# Patient Record
Sex: FEMALE | Race: WHITE | Employment: FULL TIME | URBAN - METROPOLITAN AREA
[De-identification: names, ages, dates, MRNs, and addresses within clinical notes are randomized per-mention and may not be internally consistent; named-entity substitution may affect disease eponyms.]

---

## 2017-04-13 ENCOUNTER — HOSPITAL ENCOUNTER (OUTPATIENT)
Dept: MAMMOGRAPHY | Age: 62
Discharge: HOME | End: 2017-04-13
Attending: INTERNAL MEDICINE | Admitting: INTERNAL MEDICINE

## 2017-04-13 DIAGNOSIS — Z13.820 SCREENING FOR OSTEOPOROSIS: ICD-10-CM

## 2018-03-16 ENCOUNTER — HOSPITAL ENCOUNTER (OUTPATIENT)
Dept: MAMMOGRAPHY | Age: 63
Discharge: HOME | End: 2018-03-16
Attending: SURGERY | Admitting: SURGERY

## 2018-03-16 DIAGNOSIS — Z12.31 ENCOUNTER FOR SCREENING MAMMOGRAM FOR BREAST CANCER: ICD-10-CM

## 2018-07-23 ENCOUNTER — INITIAL CONSULT (OUTPATIENT)
Dept: GASTROENTEROLOGY | Age: 63
End: 2018-07-23

## 2018-07-23 VITALS
WEIGHT: 177 LBS | SYSTOLIC BLOOD PRESSURE: 134 MMHG | DIASTOLIC BLOOD PRESSURE: 78 MMHG | HEIGHT: 63 IN | BODY MASS INDEX: 31.36 KG/M2

## 2018-07-23 DIAGNOSIS — K58.0 IRRITABLE BOWEL SYNDROME WITH DIARRHEA: Primary | ICD-10-CM

## 2018-07-23 PROCEDURE — 99204 OFFICE O/P NEW MOD 45 MIN: CPT | Performed by: INTERNAL MEDICINE

## 2018-07-23 RX ORDER — ROSUVASTATIN CALCIUM 10 MG/1
10 TABLET, COATED ORAL DAILY
COMMUNITY

## 2018-07-23 NOTE — PATIENT INSTRUCTIONS
esophagus, but rarely before the age of five years. Gender - Men are more commonly diagnosed with Pearson's esophagus than women. Ethnic background - Pearson's esophagus is equally common in white and  populations and is uncommon in black and  populations. Lifestyle - Smokers are more commonly diagnosed with Pearson's esophagus than nonsmokers. PEARSON'S ESOPHAGUS SYMPTOMS - Pearson's esophagus itself produces no symptoms. Instead, most people seek help because of symptoms of GERD, including heartburn, regurgitation of stomach contents, and, less commonly, difficulty swallowing. PEARSON'S ESOPHAGUS DIAGNOSIS - A healthcare provider may suspect Pearson's esophagus based upon a person's symptoms and the risk factors described above. An endoscopy is needed to confirm the abnormal esophageal lining. Upper endoscopy - Upper endoscopy is a test that allows your doctor to see the inside of the esophagus and stomach. Before the test, you are sedated to prevent discomfort. The doctor will insert a thin lighted tube into the esophagus. The tube has a camera, which allows the doctor to see the lining of the esophagus. Normally, the lining should appear pale and glossy; in a person with Pearson's esophagus, the lining appears pink or red and velvety. The doctor will remove a small sample of the lining during the endoscopy so that it can be examined with a microscope for signs of Pearson's. (See \"Patient information: Upper endoscopy\". )    Endoscopy detects most (80 percent) but not all cases of Pearson's esophagus. Individual variations in the anatomy of the esophagus and the area where it meets the stomach can make the diagnosis of Pearson's esophagus difficult in some people. PEARSON'S ESOPHAGUS TREATMENT - The goal of treatment in patients with Pearson's esophagus is to control reflux symptoms.  Aggressive reflux treatment may be more effective in preventing cancer than treating only when there are reflux symptoms. (See \"Management of Pearson's esophagus\". )    Behavior and diet changes - The first priority in treating Pearson's esophagus is to stop the damage to the esophageal lining, which usually means eliminating acid reflux. Most patients are advised to avoid certain foods and behaviors that increase the risk of reflux. Foods that can worsen reflux include:        Chocolate      Coffee and tea      Peppermint      Alcohol      Fatty foods    Acidic juices such as orange or tomato juice may also worsen symptoms. Carbonated beverages can be a problem for some people. (See \"Patient information: Acid reflux (gastroesophageal reflux disease) in adults\". )    Behaviors that can worsen reflux include eating meals just before going to bed, lying down after eating meals, and eating very large meals. Placing bricks or blocks under the head of the bed (to raise it by about six inches) help to keep acid in the stomach while sleeping. It is not helpful to use additional pillows under the head. Medications - A clinician may prescribe medications that reduce the amount of acid produced by the stomach. A class of medications called proton pump inhibitors are commonly recommended. Five different formulations (some of which are available as a generic) are currently available: omeprazole (Prilosec®), esomeprazole (Nexium®), lansoprazole (Prevacid®), rabeprazole (Aciphex®) and pantoprazole (Protonix®); any of these is an acceptable option. Surgery - People who have severe reflux may benefit from surgical procedures to reduce reflux. Surgery is not the best treatment in all situations, so you should discuss this option with your doctor. More information about surgical treatments for reflux is available in a separate topic review.  (See \"Patient information: Acid reflux (gastroesophageal reflux disease) in adults\".)    PEARSON'S ESOPHAGUS COMPLICATIONS - One potential complication of Pearson's esophagus is that, treatment options may have unacceptably high risks. PRECANCEROUS CHANGES AND PEARSON'S ESOPHAGUS    Confirmation and staging - If precancerous changes are discovered, they should be confirmed by a second pathologist, an expert in examining tissue samples. It is sometimes difficult to correctly identify precancerous changes, especially when there is inflammation (usually caused by the ongoing reflux of acid). Many clinicians increase the dose of acid-suppressing medications in this situation. The precancerous changes must then be graded as \"low grade dysplasia\" or \"high grade dysplasia,\" depending upon their severity. Treatment options - People with low grade dysplasia are usually told to increase their dose of acid suppressing medication and undergo a repeat endoscopy within six months. A person with high grade dysplasia has more limited options. The management of this condition is controversial. The optimal treatment depends upon the person's age and health and the patient and physician's preference. The options include removal of the esophagus (esophagectomy) and removing (eg, endoscopic mucosal resection) or destroying (eg, radiofrequency ablation, photodynamic or other ablation therapies) the abnormal tissue. Esophagectomy - Esophagectomy is the only treatment for high-grade dysplasia that clearly removes all of the precancerous tissue, although this treatment also has the highest rates of procedure-related death and long-term complications. Reasons to remove the esophagus (esophagectomy) include:        Cancer is already present in some patients with high grade dysplasia. Not removing the esophagus would mean that the person would need frequent monitoring with endoscopy and numerous biopsies. Once Pearson's esophagus has progressed to high grade dysplasia, further progression to cancer is common and may occur rapidly.       Esophageal cancer that begins to invade other tissue may be help.  Several treatments and therapies are available for irritable bowel syndrome. These measures help alleviate symptoms, but do not cure the condition. The chronic nature of irritable bowel syndrome and the challenge of controlling its symptoms can be frustrating for both patients and healthcare providers. IRRITABLE BOWEL SYNDROME CAUSES - There are a number of theories about how and why irritable bowel syndrome develops. Despite intensive research, the cause is not clear. One theory suggests that irritable bowel syndrome is caused by abnormal contractions of the colon and intestines (hence the term \"spastic bowel,\" which has sometimes been used to describe irritable bowel syndrome). Vigorous contractions of the intestines can cause severe cramps, providing the rationale for some of the treatments of IBS, such as antispasmodics and fiber (both of which help to regulate the contractions of the colon). However, abnormal contractions do not explain irritable bowel syndrome in all patients, and it is unclear whether the contractions are a symptom or cause of the disorder. Some people develop irritable bowel syndrome after a severe gastrointestinal infection (eg, Salmonella or Campylobacter). It is not clear how the infection triggers IBS to develop, and most people with irritable bowel syndrome do not have a history of these infections. People with irritable bowel syndrome who seek medical help are more likely to suffer from anxiety and stress than those who do not seek help. Stress and anxiety are known to affect the intestine; thus, it is likely that anxiety and stress worsen symptoms. However, stress or anxiety is probably not the cause. Some studies have suggested that irritable bowel syndrome is more common in people who have a history of physical, verbal, or sexual abuse.    Food intolerances are common in patients with irritable bowel syndrome, raising the possibility that it is caused by food sensitivity - Many clinicians recommend temporarily eliminating milk products since lactose intolerance is common and can aggravate irritable bowel syndrome or cause symptoms similar to IBS. The greatest concentration of lactose is found in milk and ice cream, although it is present in smaller quantities in yogurt, cottage and other cheeses, and any prepared foods that contain these ingredients   All lactose containing products should be eliminated for two weeks. If IBS symptoms improve, it is reasonable to continue avoiding lactose. If symptoms do not improve, you may resume eating lactose-containing foods. Foods that cause gas - Several foods are only partially digested in the small intestines. When they reach the colon (large intestine), further digestion takes place, which may cause gas and cramps. Eliminating these foods temporarily is reasonable if gas or bloating is bothersome. The most common gas-producing foods are legumes (such as beans) and cruciferous vegetables (such as cabbage, [place] sprouts, cauliflower, and broccoli). In addition, some people have trouble with onions, celery, carrots, raisins, bananas, apricots, prunes, sprouts, and wheat. Foods that are easier - The following table provides a list of foods that may be easier to digest in people with IBS. Increasing dietary fiber - Increasing dietary fiber (either by adding certain foods to the diet or using fiber supplements) may relieve symptoms of IBS, particularly if you have constipation. By reading the product information panel on the side of the package, you can determine the number of grams of fiber per serving. Fiber may also be helpful in people with diarrhea predominant symptoms since it can improve the consistency of stools. A bulk-forming fiber supplement (such as psyllium or methylcellulose) may also be recommended to increase fiber intake since it is difficult to consume enough fiber in the diet.  Fiber supplements should be started at a medications that may be recommended include mirtazapine (Remeron®), venlafaxine (Effexor®), and duloxetine (Cymbalta®). Antidiarrheal drugs - The drugs loperamide (Imodium®) or diphenoxylate with atropine (Lomotil®) can help slow the movement of stool through the digestive tract. Loperamide and diphenoxylate/atropine are most helpful if you have diarrhea-predominant irritable bowel syndrome. However, clinicians usually recommend that these drugs should only be used as needed rather than on a continuous basis. Anti-anxiety drugs - Anti-anxiety drugs reduce anxiety. Diazepam (Valium®), lorazepam (Ativan®), and clonazepam (Klonopin®) belong to this class of drugs. Anti-anxiety drugs are occasionally prescribed for people with short-term anxiety that is worsening their irritable bowel syndrome symptoms. However, these drugs should only be taken for short periods of time since they can be addictive. Alosetron - Alosetron (Lotronex®) blocks a hormone that is involved in intestinal contractions and sensations. It is approved to treat women with irritable bowel syndrome whose predominant symptom is diarrhea. However, it was withdrawn from the market soon after its introduction because of concerns related to safety. It was reintroduced and is currently available, although certain prescribing guidelines must be followed. Further information is available on the 's web site (www.lotronex. [employer]). Lubiprostone - Lubiprostone (Amitiza®) is available for treatment of severe constipation and irritable bowel syndrome in women over 18 years who have not responded to other treatments. It works by increasing intestinal fluid secretion. It is expensive compared to other agents. Further testing is needed to clarify the effectiveness and long-term safety of lubiprostone. Antibiotics - The role of antibiotics in the treatment of irritable bowel syndrome remains unclear.  There appear to be some patients whose irritable bowel monitor symptoms over time. If symptoms change over time, further testing may be recommended. Over time, less than 5 percent of people diagnosed with irritable bowel syndrome will be diagnosed with another gastrointestinal condition. SUMMARY  Irritable bowel syndrome (IBS) is a common gastrointestinal disorder affecting approximately 10 to 20 percent of the population. Although the condition cannot be cured, treatments are available to alleviate symptoms. No single cause of irritable bowel syndrome has been identified, although there are theories that gastrointestinal abnormalities, food intolerance, and psychological issues may be involved. (See 'Irritable bowel syndrome causes' above.)   The primary symptoms of irritable bowel syndrome are abdominal pain and changes in bowel habits (eg, diarrhea and/or constipation). Abdominal pain can vary in location and severity. Patients can experience primarily diarrhea, primarily constipation, or an alternating pattern of the two; additional gastrointestinal symptoms may also occur. (See 'Symptoms of irritable bowel syndrome' above.)   There is no single diagnostic test for irritable bowel syndrome, and several other gastrointestinal conditions produce similar symptoms; a patient's history, physical examination, and blood test results are all reviewed to rule out other disorders and establish a diagnosis of IBS. (See 'Irritable bowel syndrome diagnosis' above.)   There are many different treatments available to relieve the symptoms of irritable bowel syndrome; these include the monitoring of symptoms and patterns, adjustment of the diet to increase fiber and eliminate foods that can worsen symptoms, psychosocial therapy (since stress may aggravate IBS), and medication. Treatments are often used in combination, and because of the variability of symptoms, different treatments work for different people.  (See 'Irritable bowel syndrome treatment' above.)   Many herbal and natural therapies have been advertised for the treatment of irritable bowel syndrome; however, these therapies have not been proven effective and they are not recommended. (See 'Herbs and natural therapies for irritable bowel syndrome' above.)   Although irritable bowel syndrome can cause pain and stress, the majority of patients are able to control their symptoms and live a normal life without developing serious health problems. (See 'Irritable bowel syndrome prognosis' above.)  WHERE TO GET MORE INFORMATION - Your healthcare provider is the best source of information for questions and concerns related to your medical problem. This article will be updated as needed every four months on our Web site (www.[email or web address]/patients). Low gas diet especially broccoli, cauliflower, brussel sprouts, cabbage, and beans. Keeping a diet log avoiding disaccharides 1 week at a time (lactose, fructose, sucrose), a low FODMAP diet, a trial of otc agents including simethicone/charcoal tablets and/or beano with meals. Offered hydrogen breath testing to rule out small bacterial overgrowth, lactose, or fructose intolerance. Trial of a probiotic like Align or Culturele 1 twice a day. If not worse with fructose try to increase soluble fiber in the diet with fruit, nuts, cereal, or supplemental fiber such as citrucele, fibersure, or benefiber which can help with diarrhea. A trial if daily miralax for IBS-C(irritable bowel with constipation)/IBS-A(irritable bowel with alternating bowel movement). Low FODMAP Diet for IBS   The low FODMAP diet is an approach to control symptoms associated with irritable bowel syndrome (IBS). Carbohydrates can be present in different forms in foods. Some are well digested and absorbed to produce energy. Others are resistant to digestion but are important for stool formation and normal bowel function.   There is evidence that one group of poorly absorbable, highly gas-forming carbohydrates may increase IBS symptoms. These foods are collectively called FODMAPs (Fermentable Oligosaccharides, Disaccharides, Monosaccharides and Polyols). Several high-quality clinical studies have shown symptom improvement in some people with IBS due to reduction in FODMAP intake. FODMAPs are found in a wide range of foods. Examples include:  Fruits such as mangoes, apples, pears, avocados, blackberries, and plums   Dairy products like cow, sheep, and goat milk, as well as yogurt, ice cream, and soft cheeses   Honey   Vegetables and legumes such as asparagus, bell peppers, broccoli, [place] sprouts, cabbage, cauliflower, eggplant, onion, garlic, baked beans, kidney beans, and lentils   Sweeteners like sorbitol and maltitol (frequently used in gum and other candies)  Effects of FODMAPs on the Gut  In the small and large intestine, FODMAPs can cause an increase in fluid and gas that distends the bowel. This can cause the sensation of bloating and abdominal pain or discomfort. It can also affect how the muscles in the wall of the bowel contract leading to diarrhea in some people or constipation in others. The malabsorption of FODMAPs occurs to the same extent in healthy people and is a normal phenomenon. In people with IBS the bowel symptoms are induced more readily. The reasons for this may include: The way the muscles of the bowel respond (motility) to the distension   A heightened sensitivity within the digestive tract in people with IBS, which lowers the threshold for feeling pain   The type or number of bacteria in the bowel  From Science to Application  The application of the low FODMAP diet requires the expert guidance of a dietician trained in the area. A typical approach would involve restricting problematic FODMAPs for 6-8 weeks, or until good symptom control is achieved. After this, small amounts of FODMAP-containing foods are re-introduced through challenges as advised by the dietician.  The aim of challenging is

## 2018-07-23 NOTE — LETTER
Bhavesh Waller    [address] [facility] [address]  [facility] [phone]   [phone]    07/24/18  Patient: Phillip Saldivar   MR Number: [id number]   YOB: 1955   Date of Visit: 7/23/18     Dear Dr. Jessica uMniz MD    Thank you for the request for consultation for Phillip Saldivar to me for the evaluation of   Chief Complaint   Patient presents with   [address]     NP- diarrhea, previous [place] pt   . Below are the relevant portions of my assessment and plan of care. FINAL DIAGNOSIS/Assessment   Diagnosis Orders   1. Irritable bowel syndrome with diarrhea         VISIT ORDERS/Plan  No orders of the defined types were placed in this encounter. If you have questions, please do not hesitate to call me. I look forward to following Phillip Saldivar along with you.     Sincerely,        Elham Mattsonja 21 7/23/18 1:31 PM

## 2018-07-24 ENCOUNTER — TELEPHONE (OUTPATIENT)
Dept: GASTROENTEROLOGY | Age: 63
End: 2018-07-24

## 2018-07-26 ENCOUNTER — TELEPHONE (OUTPATIENT)
Dept: GASTROENTEROLOGY | Age: 63
End: 2018-07-26

## 2018-07-30 ENCOUNTER — TELEPHONE (OUTPATIENT)
Dept: GASTROENTEROLOGY | Age: 63
End: 2018-07-30

## 2018-08-21 ENCOUNTER — TELEPHONE (OUTPATIENT)
Dept: GASTROENTEROLOGY | Age: 63
End: 2018-08-21

## 2018-09-27 ENCOUNTER — TELEPHONE (OUTPATIENT)
Dept: GASTROENTEROLOGY | Age: 63
End: 2018-09-27

## 2018-09-28 ENCOUNTER — HOSPITAL ENCOUNTER (OUTPATIENT)
Age: 63
Setting detail: OUTPATIENT SURGERY
Discharge: HOME HEALTH | End: 2018-09-28
Attending: INTERNAL MEDICINE | Admitting: INTERNAL MEDICINE
Payer: COMMERCIAL

## 2018-09-28 VITALS
HEIGHT: 63 IN | DIASTOLIC BLOOD PRESSURE: 67 MMHG | BODY MASS INDEX: 31.01 KG/M2 | SYSTOLIC BLOOD PRESSURE: 116 MMHG | HEART RATE: 73 BPM | WEIGHT: 175 LBS | OXYGEN SATURATION: 97 % | RESPIRATION RATE: 18 BRPM | TEMPERATURE: 98.4 F

## 2018-09-28 LAB
GLUCOSE BLD-MCNC: 109 MG/DL (ref 70–99)
PERFORMED ON: ABNORMAL

## 2018-09-28 PROCEDURE — 43235 EGD DIAGNOSTIC BRUSH WASH: CPT | Performed by: INTERNAL MEDICINE

## 2018-09-28 PROCEDURE — 99152 MOD SED SAME PHYS/QHP 5/>YRS: CPT | Performed by: INTERNAL MEDICINE

## 2018-09-28 PROCEDURE — 2709999900 HC NON-CHARGEABLE SUPPLY: Performed by: INTERNAL MEDICINE

## 2018-09-28 PROCEDURE — 6360000002 HC RX W HCPCS: Performed by: INTERNAL MEDICINE

## 2018-09-28 PROCEDURE — 7100000010 HC PHASE II RECOVERY - FIRST 15 MIN: Performed by: INTERNAL MEDICINE

## 2018-09-28 PROCEDURE — 3609012800 HC EGD DIAGNOSTIC ONLY: Performed by: INTERNAL MEDICINE

## 2018-09-28 PROCEDURE — 7100000011 HC PHASE II RECOVERY - ADDTL 15 MIN: Performed by: INTERNAL MEDICINE

## 2018-09-28 RX ORDER — AMLODIPINE BESYLATE 10 MG/1
10 TABLET ORAL DAILY
COMMUNITY

## 2018-09-28 RX ORDER — ALUMINUM ZIRCONIUM OCTACHLOROHYDREX GLY 16 G/100G
1 GEL TOPICAL DAILY
COMMUNITY

## 2018-09-28 RX ORDER — MIDAZOLAM HYDROCHLORIDE 5 MG/ML
INJECTION INTRAMUSCULAR; INTRAVENOUS PRN
Status: DISCONTINUED | OUTPATIENT
Start: 2018-09-28 | End: 2018-09-28 | Stop reason: HOSPADM

## 2018-09-28 RX ORDER — SODIUM CHLORIDE, SODIUM LACTATE, POTASSIUM CHLORIDE, CALCIUM CHLORIDE 600; 310; 30; 20 MG/100ML; MG/100ML; MG/100ML; MG/100ML
INJECTION, SOLUTION INTRAVENOUS CONTINUOUS
Status: DISCONTINUED | OUTPATIENT
Start: 2018-09-28 | End: 2018-09-28 | Stop reason: HOSPADM

## 2018-09-28 RX ORDER — FENTANYL CITRATE 50 UG/ML
INJECTION, SOLUTION INTRAMUSCULAR; INTRAVENOUS PRN
Status: DISCONTINUED | OUTPATIENT
Start: 2018-09-28 | End: 2018-09-28 | Stop reason: HOSPADM

## 2018-09-28 ASSESSMENT — PAIN - FUNCTIONAL ASSESSMENT: PAIN_FUNCTIONAL_ASSESSMENT: 0-10

## 2019-04-12 ENCOUNTER — HOSPITAL ENCOUNTER (OUTPATIENT)
Dept: WOMENS IMAGING | Age: 64
Discharge: HOME | End: 2019-04-12
Payer: COMMERCIAL

## 2019-04-12 DIAGNOSIS — R92.0 ABNORMAL FINDING ON MAMMOGRAPHY, MICROCALCIFICATION: ICD-10-CM

## 2019-04-12 DIAGNOSIS — Z12.31 ENCOUNTER FOR SCREENING MAMMOGRAM FOR MALIGNANT NEOPLASM OF BREAST: ICD-10-CM

## 2019-04-12 PROCEDURE — 77065 DX MAMMO INCL CAD UNI: CPT

## 2019-04-12 PROCEDURE — 77067 SCR MAMMO BI INCL CAD: CPT

## 2019-04-19 ENCOUNTER — HOSPITAL ENCOUNTER (OUTPATIENT)
Dept: WOMENS IMAGING | Age: 64
Discharge: HOME | End: 2019-04-19
Payer: COMMERCIAL

## 2019-04-19 DIAGNOSIS — C50.919 MALIGNANT NEOPLASM OF FEMALE BREAST, UNSPECIFIED ESTROGEN RECEPTOR STATUS, UNSPECIFIED LATERALITY, UNSPECIFIED SITE OF BREAST (HCC): ICD-10-CM

## 2019-04-19 PROCEDURE — 77080 DXA BONE DENSITY AXIAL: CPT

## 2019-12-13 ENCOUNTER — HOSPITAL ENCOUNTER (OUTPATIENT)
Dept: WOMENS IMAGING | Age: 64
Discharge: HOME | End: 2019-12-13
Payer: COMMERCIAL

## 2019-12-13 DIAGNOSIS — R92.0 ABNORMAL FINDING ON MAMMOGRAPHY, MICROCALCIFICATION: ICD-10-CM

## 2019-12-13 DIAGNOSIS — R92.8 ABNORMAL MAMMOGRAM OF RIGHT BREAST: ICD-10-CM

## 2019-12-13 PROCEDURE — G0279 TOMOSYNTHESIS, MAMMO: HCPCS

## 2021-02-23 ENCOUNTER — IMMUNIZATION (OUTPATIENT)
Dept: PRIMARY CARE CLINIC | Age: 66
End: 2021-02-23
Payer: COMMERCIAL

## 2021-02-23 PROCEDURE — 91301 COVID-19, MODERNA VACCINE 100MCG/0.5ML DOSE: CPT | Performed by: FAMILY MEDICINE

## 2021-02-23 PROCEDURE — 0011A PR IMM ADMN SARSCOV2 100 MCG/0.5 ML 1ST DOSE: CPT | Performed by: FAMILY MEDICINE

## 2021-03-23 ENCOUNTER — IMMUNIZATION (OUTPATIENT)
Dept: PRIMARY CARE CLINIC | Age: 66
End: 2021-03-23
Payer: COMMERCIAL

## 2021-03-23 PROCEDURE — 91301 COVID-19, MODERNA VACCINE 100MCG/0.5ML DOSE: CPT | Performed by: FAMILY MEDICINE

## 2021-03-23 PROCEDURE — 0012A PR IMM ADMN SARSCOV2 100 MCG/0.5 ML 2ND DOSE: CPT | Performed by: FAMILY MEDICINE

## 2021-12-03 ENCOUNTER — APPOINTMENT (OUTPATIENT)
Dept: PHYSICAL THERAPY | Age: 66
End: 2021-12-03
Payer: MEDICARE

## 2021-12-07 ENCOUNTER — APPOINTMENT (OUTPATIENT)
Dept: PHYSICAL THERAPY | Age: 66
End: 2021-12-07
Payer: MEDICARE

## 2021-12-10 ENCOUNTER — APPOINTMENT (OUTPATIENT)
Dept: PHYSICAL THERAPY | Age: 66
End: 2021-12-10
Payer: MEDICARE

## 2021-12-14 ENCOUNTER — APPOINTMENT (OUTPATIENT)
Dept: PHYSICAL THERAPY | Age: 66
End: 2021-12-14
Payer: MEDICARE

## 2021-12-17 ENCOUNTER — APPOINTMENT (OUTPATIENT)
Dept: PHYSICAL THERAPY | Age: 66
End: 2021-12-17
Payer: MEDICARE

## 2021-12-20 ENCOUNTER — APPOINTMENT (OUTPATIENT)
Dept: PHYSICAL THERAPY | Age: 66
End: 2021-12-20
Payer: MEDICARE

## 2021-12-22 ENCOUNTER — APPOINTMENT (OUTPATIENT)
Dept: PHYSICAL THERAPY | Age: 66
End: 2021-12-22
Payer: MEDICARE

## 2021-12-28 ENCOUNTER — APPOINTMENT (OUTPATIENT)
Dept: PHYSICAL THERAPY | Age: 66
End: 2021-12-28
Payer: MEDICARE

## 2021-12-30 ENCOUNTER — APPOINTMENT (OUTPATIENT)
Dept: PHYSICAL THERAPY | Age: 66
End: 2021-12-30
Payer: MEDICARE

## 2022-01-18 ENCOUNTER — HOSPITAL ENCOUNTER (OUTPATIENT)
Dept: PHYSICAL THERAPY | Age: 67
Setting detail: THERAPIES SERIES
Discharge: HOME | End: 2022-01-18
Payer: MEDICARE

## 2022-01-18 PROCEDURE — 97110 THERAPEUTIC EXERCISES: CPT

## 2022-01-18 PROCEDURE — 97161 PT EVAL LOW COMPLEX 20 MIN: CPT

## 2022-01-18 NOTE — FLOWSHEET NOTE
[address] [facility], [address]  Phone [phone]                                                [] Daily Treatment Note   [] Progress Note   [] Discharge Note      Date:  2022    Patient Name:  Aly Berg        :  1955  Medical Diagnosis:OA R hip   , s/p THR on 1/3/22                                                    ICD 10:  M16.11     Treatment Diagnosis:  Gait and balance disturbance, LE weakness, unable to drive,      Onset Date:    1/3/22  Monday                Referral Date: 1/3/22      Referring Physician:  Dr. Ramiro Wolf of care signed (Y/N): Faxed 22    Progress report will be due (10 Rx or 30 days whichever is less):  31    Recertification will be due (POC Duration  / 90 days whichever is less):  22    Visit# / total visits:   Visit # Insurance Allowable Auth Required   In Person  1  medicare and [address] []  Yes     []  No    Tele Health 0  []  Yes     []  No    Total  1       Latex Allergy:  [x]NO      []YES    Pain level: /10     Subjective:   22 she had her THR and spent 1 night in the hospital.( she did  1course of steroids to help with the healing process,)she has had 2 wk of home therapy. She was using the cane a week ago. Her L thigh has been numb since surgery, 1 wk ago she started having stabbing sharp  L thigh pain/burning. Then the home PT showed her IT band stretch/ piriformis stretch both in supine and it is feeling better now( 50%), last night she was able to sleep, she has been using a heating pad on the L thigh and ice on the R hip. She is on another course of steroids due to her thigh flaring up on fri/sat again. She has been using her walker again after the L thigh flared up. She has started celebrex again yesterday and today. She is not taking pain meds.   Yesterday she started using the cane intermit at home again. .  Dr. Radha Nevarez told her during surgery she was on her L side with her knee up close to her chest- sometimes this bothers the nerve and/or thigh. Returns to Dr. Radha Nevarez 2/24/22. She is going on vacation 2/25/22  In notes from [facility] found: posterior hip precautions: no add past neutral, no flx past 90, no IR, avoid hip abd ex,         Pain    Patient describes pain to be constant R hip pain. Constant L thigh pain- ant/lat  Patient reports  Hip:    2/10 pain at rest,   4/10 pain with activity                             L  Thigh:  4-6/10  Worsened by -  incr activity  Improved by - ice/resting  Pt. also reports that the knee/hip/ankle gives out, locks, pops, grinds. no  Current Functional Limitations: not driving, chores, activities  PLOF: (I)  Pt. Sleep is disturbed? Yes due to L thigh     Patient goal for therapy:  Get stronger, progress to cane, than walk without her cane, less pain.      Exercises:   Exercise/Equipment Resistance/Repetitions Other comments   1/18/22 explained course and role of PT     Cont her current HEP     Heel slide, supine abd, GS, QS, standing heelraise, march LAQ     Single stand L 30 sec   R 5-8 sec    Bridges- small ROM 2x10    Standing hip abd Hold 3 sec    LAQ Hold 10 sec  2x 10    Sit back squats 2x10                                     NV- nu step, progress to closed chain, balance, gait with st cane when able, end with CP                           Therapeutic Exercise:   30 min. PROM hip flx to 90    Group Therapy:      Home Exercise Program:      Therapeutic Activity:      Neuromuscular Re-education:      Gait:      Manual Therapy:      Canalith Repositioning Procedure:       Modalities:  NV- CP after ex.     Charges:  Timed Code Treatment Minutes:  30   Total Treatment Minutes:  45   [facility] time for each procedure?:  TE TIME:  NMR TIME:  MANUAL TIME:  UNTIMED MINUTES:          [x] EVAL (LOW) 11456 (typically 20 minutes face-to-face)  [] EVAL (MOD) 11179 (typically 30 minutes face-to-face)  [] EVAL (HIGH) 65733 (typically 45 minutes face-to-face)  [] RE-EVAL     [x] YM(60287) x 2    [] IONTO  [] NMR (96269) x     [] VASO  [] Manual (01446) x     [] Other:  [] TA x      [] Mech Traction (84629)  [] ES(attended) (56679)     [] ES (un) (57673): Medicare Cap Total YTD:      Treatment/Activity Tolerance:    [x] Patient tolerated treatment well [] Patient limited by fatigue   [] Patient limited by pain [] Patient limited by other medical complications   [] Other:     Prognosis: [x] Good [] Fair  [] Poor    Patient Requires Follow-up:  [x] Yes  [] No    Plan: [] Continue per plan of care [] Alter current plan (see comments)   [x] Plan of care initiated [] Hold pending MD visit [] Discharge    Plan for Next Session:  above    See Progress Note: [x] Yes  [] No       Next due:         Electronically signed by:  Luis Cedeno PT, Mini  MOMT      Note: If patient does not return for scheduled/ recommended follow up visits, this note will serve as a discharge from care along with most recent update on progress.            Outpatient Physical Therapy  Progress Note      Progress Note covers period from: 1/18/22   To       Subjective:           Objective:   Observation:   Test measurements:       Functional Outcome Measure:            Assessment:   Summary:    Patient's response to treatment:      Goals:  · Progress toward previous goals:      Plan:  ·     Electronically Signed by:

## 2022-01-18 NOTE — PROGRESS NOTES
[facility], [facility], [address]  Phone: [phone]                                                    Physical Therapy Certification    We had the pleasure of evaluating the following patient for physical therapy services at [address] [place] [address]. A summary of our findings can be found in the initial assessment below. This includes our plan of care. If you have any questions or concerns regarding these findings, please do not hesitate to contact me at the office phone number checked above. Thank you for the referral.       Physician Signature:_______________________________Date:__________________  By signing above (or electronic signature), therapists plan is approved by physician    LOWER EXTREMITY PHYSICAL THERAPY EVALUATION    Patient: Mar Lechuga   : 1955   MRN: [id number]      Medical Diagnosis:OA R hip   , s/p THR on 1/3/22         ICD 10:  M16.11    Treatment Diagnosis:  Gait and balance disturbance, LE weakness, unable to drive,     Onset Date: 1/3/22  Monday     Referral Date: 1/3/22     Referring Physician:  Dr. Elzbieta Mae        Visits Allowed/Insurance/Certification Information:  Medicare and AARP    Precautions/ Contra-indications/Relevant Medical History:    C-SSRS Triggered by Intake questionnaire (Past 2 wk assessment):   [x] No, Questionnaire did not trigger screening.   [] Yes, Patient intake triggered further evaluation      [] C-SSRS Screening completed  [] PCP notified via Plan of Care  [] Emergency services notified     Pt's Occupation/Job Duties:   part time. Social support/Environment:  Lives with  in ranch home. 2 steps to enter. Small steps. No rail    Health History reviewed with pt:  Yes. Hx of CA, hx of DM but not now.   High cholesterol    SUBJECTIVE FINDINGS    History of Present Illness:  22 she had her THR and spent 1 night in the hospital.( she did  1course of steroids to help with the healing process,)she has had 2 wk of home therapy. She was using the cane a week ago. Her L thigh has been numb since surgery, 1 wk ago she started having stabbing sharp  L thigh pain/burning. Then the home PT showed her IT band stretch/ piriformis stretch both in supine and it is feeling better now( 50%), last night she was able to sleep, she has been using a heating pad on the L thigh and ice on the R hip. She is on another course of steroids due to her thigh flaring up on fri/sat again. She has been using her walker again after the L thigh flared up. She has started celebrex again yesterday and today. She is not taking pain meds. Yesterday she started using the cane intermit at home again. .  Dr. Naif Damico told her during surgery she was on her L side with her knee up close to her chest- sometimes this bothers the nerve and/or thigh. Returns to Dr. Naif Damico 2/24/22. She is going on vacation 2/25/22  In notes from [phone] found: posterior hip precautions: no add past neutral, no flx past 90, no IR, avoid hip abd ex,       Pain    Patient describes pain to be constant R hip pain. Constant L thigh pain- ant/lat  Patient reports  Hip:    2/10 pain at rest,   4/10 pain with activity                             L  Thigh:  4-6/10  Worsened by -  incr activity  Improved by - ice/resting  Pt. also reports that the knee/hip/ankle gives out, locks, pops, grinds. no  Current Functional Limitations: not driving, chores, activities  PLOF: (I)  Pt. Sleep is disturbed? Yes due to L thigh    Patient goal for therapy:  Get stronger, progress to cane, than walk without her cane, less pain. OBJECTIVE FINDINGS    Posture :  Stands with level pelvis    Gait/Steps/Balance    Deviations on a level linoleum surface include - using walker, also uses st cane at home  Stairs: NT  Balance: able to ss for 30 sec on left   5-8 sec on R    Lumbar Spine  NT.  Has  Had back pain in the past but it does not mimick her L thigh pain, she is much better with her back from doing yoga.     Range of Motion/Strength Testing    Range Tested AROM PROM MMT/Resisted Comments   *denotes pain Left Right Left Right Left Right    Hip Flexion    90 4+/5 4/5    Hip Extension    neutral /5 /5    Hip Abduction    30 /5 /5    Hip Adduction    midline /5 /5    Hip IR     /5 /5    Hip ER     /5 /5    Knee Flexion     4+/5 4+/5    Knee Extension     4+/5 4+/5    Ankle Dorsiflex     5/5 5/5    Ankle Plantarflex     4/5 4/5    Ankle Inversion     /5 /5    Ankle Eversion     /5 /5           Flexibility    Muscle Left Right Comment Muscle Left Right Comment   Hamstrings (90/90)    TFL/ITB (Shira)      Gastroc    Iliopsoas (Aydin)      Soleus    Rectus Femoris      Piriformis              Palpation/Tenderness/Visual Inspection    Noted  /moderate tenderness  Over and around the ant/lateral incison  Noted redness   Noted warmth      Joint Mobility/Accessory Motions  NT  Noted decreased patellar mobility with    Noted capsular pattern in hip joint          Co-morbidities/Complexities (which will affect course of rehabilitation):  []None           Arthritic conditions   []Rheumatoid arthritis (M05.9)  [x]Osteoarthritis (M19.91)   Cardiovascular conditions   []Hypertension (I10)  [x]Hyperlipidemia (E78.5)  []Angina pectoris (I20)  []Atherosclerosis (I70)   Musculoskeletal conditions   []Disc pathology   []Congenital spine pathologies   []Prior surgical intervention  [x]Osteoporosis (M81.8)  []Osteopenia (M85.8)   Endocrine conditions   []Hypothyroid (E03.9)  []Hyperthyroid Gastrointestinal conditions   []Constipation (Q04.17)   Metabolic conditions   []Morbid obesity (E66.01)  []Diabetes type 1(E10.65) or 2 (E11.65)   []Neuropathy (G60.9)     Pulmonary conditions   []Asthma (J45)  []Coughing   []COPD (J44.9)   Psychological Disorders  []Anxiety (F41.9)  []Depression (F32.9)   []Other:   []Other:          Functional Outcome Measure    Measure used:  LEFS  Score:  19  % Disability:  76%         ASSESSMENT: presents approximately 2 wks s/p R THR. In the past week her L thigh has become painful and has been limiting her mobility. It is starting to improve. She is doing well with her HEP from her home therapist. She should do well with 1 month of therapy to minimize her pain, strengthen her LE , and advance her to a st cane  And then to ambulation without AD. Functional Impairments:     []Noted lumbar/proximal hip/LE joint hypomobility   []Decreased LE functional ROM   [x]Decreased core/proximal hip strength and neuromuscular control   []Decreased LE functional strength   [x]Reduced balance/proprioceptive control   []other:      Functional Activity Limitations (from functional questionnaire and intake)   []Reduced ability to tolerate prolonged functional positions   []Reduced ability or difficulty with changes of positions or transfers between positions   []Reduced ability to maintain good posture and demonstrate good body mechanics with sitting, bending, and lifting   []Reduced ability to sleep   [x] Reduced ability or tolerance with driving and/or computer work   [x]Reduced ability to perform lifting, carrying tasks   [x]Reduced ability to squat   [x]Reduced ability to forward bend   [x]Reduced ability to ambulate prolonged functional periods/distances/surfaces   [x]Reduced ability to ascend/descend stairs   [x]Reduced ability to run, hop, cut or jump   []other:    Participation Restrictions   [x]Reduced participation in self care activities   [x]Reduced participation in home management activities   [x]Reduced participation in work activities   [x]Reduced participation in social activities. [x]Reduced participation in sport/recreation activities. Classification :    [x]Signs/symptoms consistent with post-surgical status including decreased ROM, strength and function.    []Signs/symptoms consistent with joint sprain/strain   []Signs/symptoms consistent with patella-femoral syndrome   [x]Signs/symptoms consistent with knee OA/hip OA   []Signs/symptoms consistent with internal derangement of knee/Hip   []Signs/symptoms consistent with functional hip weakness/NMR control      []Signs/symptoms consistent with tendinitis/tendinosis    []signs/symptoms consistent with pathology which may benefit from Dry needling      []other:      Rehabilitation Potential:  Good for goals listed below. Strengths for achieving goals include: motivated  Limitations for achieving goals include: none expected. Prognosis: [x]    Good []    Fair  []    Poor    Short Term Goals: 2 wks  1. Independent in HEP and progression per patient tolerance, in order to prevent re-injury. [] Progressing: [] Met: [] Not Met: [] Adjusted   2. Patient will have a decrease in pain to facilitate improvement in movement, function, and ADLs as indicated by Functional Deficits. [] Progressing: [] Met: [] Not Met: [] Adjusted     Long Term Goals: 4-8 wks  1. Disability index score of 30% or less for the LEFs functional questionnaire to assist with reaching prior level of function. [] Progressing: [] Met: [] Not Met: [] Adjusted   2. Patient will demonstrate increased AROM to  Limited to 90 flx for 6-12 wks. to allow for proper joint functioning as indicated by patients Functional Deficits. [] Progressing: [] Met: [] Not Met: [] Adjusted   3. Patient will demonstrate an increase in strength to  4+/5 for LE to allow for proper functional mobility as indicated by patients Functional Deficits. Ambulate with or without st cane. Climb 4 steps reciprocally  [] Progressing: [] Met: [] Not Met: [] Adjusted   4. Patient will return to all transfers, work activities, and functional activities without increased symptoms or restriction. [] Progressing: [] Met: [] Not Met: [] Adjusted   5.  Patient will have 0-2/10 pain with ADL's.  [] Progressing: [] Met: [] Not Met: 177.8 [] Adjusted   6. Patient stated goal:  Get stronger, walk better, go on vacation in Feb  [] Progressing: [] Met: [] Not Met: [] Adjusted     PLAN OF CARE    To see patient  2 x/week for 4weeks for the following treatment interventions:     Therapeutic Exercise   Progressive Resistive Exercise   Modalities of Choice (Heat/Cold/US/EStim/Ionto)   Gait Training   Home Exercise Program   Manual Techniques/Mobilization   Postural Reeducation   Balance Reeducation    Physical Therapy Evaluation Complexity Justification  [x] EVAL (LOW) 31792 (typically 20 minutes face-to-face)  [] EVAL (MOD) 56293 (typically 30 minutes face-to-face)  [] EVAL (HIGH) 28002 (typically 45 minutes face-to-face)  [] RE-EVAL     Thank you for the referral of this patient.      Timed Code Treatment Minutes:  30  minutes              Total Treatment Time:  45 minutes      Lisa Greenwood, PT  [id number]  [facility]

## 2022-01-21 ENCOUNTER — APPOINTMENT (OUTPATIENT)
Dept: PHYSICAL THERAPY | Age: 67
End: 2022-01-21
Payer: MEDICARE

## 2022-01-25 ENCOUNTER — HOSPITAL ENCOUNTER (OUTPATIENT)
Dept: PHYSICAL THERAPY | Age: 67
Setting detail: THERAPIES SERIES
Discharge: HOME | End: 2022-01-25
Payer: MEDICARE

## 2022-01-25 PROCEDURE — 97110 THERAPEUTIC EXERCISES: CPT

## 2022-01-25 NOTE — FLOWSHEET NOTE
[facility], [address]  Phone [phone]                                                [x] Daily Treatment Note   [] Progress Note   [] Discharge Note      Date:  2022    Patient Name:  Rosie Villagomez        :  1955  Medical Diagnosis:OA R hip, s/p THR on 1/3/22                                                    ICD 10:  M16.11     Treatment Diagnosis:  Gait and balance disturbance, LE weakness, unable to drive,      Onset Date:    1/3/22  Monday                Referral Date: 1/3/22      Referring Physician:  Dr. Darrion Grimm of care signed (Y/N): Faxed 22    Progress report will be due (10 Rx or 30 days whichever is less):  3/78/53    Recertification will be due (POC Duration  / 90 days whichever is less):  22    Visit# / total visits:   Visit # Insurance Allowable Auth Required   In Person 2 Medicare/AARP []  Yes     [x]  No    [employer] 0 Medical nec []  Yes     []  No    Total 2       Latex Allergy:  [x]NO      []YES    Pain level: R 3-4/10 L 0-5/10    Subjective:   22  Reports her surgical hip is doing pretty well 3-4/10 but her left thigh has been sore since surgery with spasms getting up to 5/10. Reports is using cane now and can walk a little without it. Pt reports she would like to start walking on treadmill again (inst her to hold off for now)  22 she had her THR and spent 1 night in the hospital.( she did  1course of steroids to help with the healing process,)she has had 2 wk of home therapy. She was using the cane a week ago. Her L thigh has been numb since surgery, 1 wk ago she started having stabbing sharp  L thigh pain/burning. Then the home PT showed her IT band stretch/ piriformis stretch both in supine and it is feeling better now( 50%), last night she was able to sleep, she has been using a heating pad on the L thigh and ice on the R hip.  She is on another course of steroids due to her thigh flaring up on fri/sat again. She has been using her walker again after the L thigh flared up. She has started celebrex again yesterday and today. She is not taking pain meds. Yesterday she started using the cane intermit at home again. .  Dr. Moi Gregg told her during surgery she was on her L side with her knee up close to her chest- sometimes this bothers the nerve and/or thigh. Returns to Dr. Moi Gregg 2/24/22. She is going on vacation 2/25/22  In notes from [facility] found: posterior hip precautions: no add past neutral, no flx past 90, no IR, avoid hip abd ex,         Pain    Patient describes pain to be constant R hip pain. Constant L thigh pain- ant/lat  Patient reports  Hip:    2/10 pain at rest,   4/10 pain with activity                             L  Thigh:  4-6/10  Worsened by -  incr activity  Improved by - ice/resting  Pt. also reports that the knee/hip/ankle gives out, locks, pops, grinds. no  Current Functional Limitations: not driving, chores, activities  PLOF: (I)  Pt. Sleep is disturbed? Yes due to L thigh     Patient goal for therapy:  Get stronger, progress to cane, than walk without her cane, less pain.      Exercises: In notes from [phone] found: posterior hip precautions: no add past neutral, no flx past 90, no IR, avoid hip abd ex,   Exercise/Equipment Resistance/Repetitions Other comments   1/18/22 explained course and role of PT     Cont her current HEP     Heel slide, supine abd, GS, QS, standing heelraise, march, LAQ, hamstring curl     Single stand L 30 sec   R 20 sec x   Bridges- small ROM 2x10 x   Standing hip abd Hold 3 sec  x   LAQ Hold 10 sec  2x 10 x   Sit back squats 2x10 x   Step up fwd  4\" 2x10 2 hand    Step up bwd 4\" 2x10 2 hand              nustep s 7 L5 x 5'                                    Therapeutic Exercise:   30 min.         Group Therapy:      Home Exercise Program:      Therapeutic Activity:      Neuromuscular Re-education:      Gait:      Manual Therapy:      Canalith Repositioning Procedure:       Modalities:  10'  CP after ex. Charges:  Timed Code Treatment Minutes: 30   Total Treatment Minutes:  30+CP   BWC time for each procedure?:  TE TIME:  NMR TIME:  MANUAL TIME:  UNTIMED MINUTES:          [] EVAL (LOW) 10219 (typically 20 minutes face-to-face)  [] EVAL (MOD) 68059 (typically 30 minutes face-to-face)  [] EVAL (HIGH) 67234 (typically 45 minutes face-to-face)  [] RE-EVAL     [x] KIMMIE(39817) x 2    [] IONTO  [] NMR (16302) x     [] VASO  [] Manual (36437) x     [] Other:  [] TA x      [] Mech Traction (05569)  [] ES(attended) (06161)     [] ES (un) (31518): Medicare Cap Total YTD:      Treatment/Activity Tolerance:    [x] Patient tolerated treatment well [] Patient limited by fatigue   [] Patient limited by pain [] Patient limited by other medical complications   [] Other:     Prognosis: [x] Good [] Fair  [] Poor    Patient Requires Follow-up:  [x] Yes  [] No    Plan: [x] Continue per plan of care [] Alter current plan (see comments)   [] Plan of care initiated [] Hold pending MD visit [] Discharge    Plan for Next Session:  above    See Progress Note: [] Yes  [x] No       Next due:         Electronically signed by:  Autumn Montaño, [id number]       Note: If patient does not return for scheduled/ recommended follow up visits, this note will serve as a discharge from care along with most recent update on progress.            Outpatient Physical Therapy  Progress Note      Progress Note covers period from: 1/18/22   To       Subjective:           Objective:   Observation:   Test measurements:       Functional Outcome Measure:            Assessment:   Summary:    Patient's response to treatment:      Goals:  · Progress toward previous goals:      Plan:  ·     Electronically Signed by:

## 2022-01-28 ENCOUNTER — APPOINTMENT (OUTPATIENT)
Dept: PHYSICAL THERAPY | Age: 67
End: 2022-01-28
Payer: MEDICARE

## 2022-02-01 ENCOUNTER — HOSPITAL ENCOUNTER (OUTPATIENT)
Dept: PHYSICAL THERAPY | Age: 67
Setting detail: THERAPIES SERIES
Discharge: HOME | End: 2022-02-01
Payer: MEDICARE

## 2022-02-01 PROCEDURE — 97110 THERAPEUTIC EXERCISES: CPT

## 2022-02-01 PROCEDURE — 97140 MANUAL THERAPY 1/> REGIONS: CPT

## 2022-02-01 NOTE — FLOWSHEET NOTE
[facility], [address]  Phone [phone]                                                [x] Daily Treatment Note   [] Progress Note   [] Discharge Note      Date:  2022    Patient Name:  Lynn Lema        :  1955  Medical Diagnosis:OA R hip, s/p THR on 1/3/22                                                    ICD 10:  M16.11     Treatment Diagnosis:  Gait and balance disturbance, LE weakness, unable to drive,      Onset Date:    1/3/22  Monday                Referral Date: 1/3/22      Referring Physician:  Dr. Belen Reece of care signed (Y/N): Faxed 22    Progress report will be due (10 Rx or 30 days whichever is less):  22  MD on     Recertification will be due (POC Duration  / 90 days whichever is less):  22    Visit# / total visits:   Visit # Insurance Allowable Auth Required   In Person 3 Medicare/AARP []  Yes     [x]  No    [employer] 0 Medical nec []  Yes     []  No    Total 3       Latex Allergy:  [x]NO      []YES    Pain level: R 2-3/10 L 1-8/10 shooting pain into thigh with some numbness, 3-4/10 walking    Subjective:   22 reports her surgical leg is doing well but the L is really bothering her. Reports she does not really have hip or low back pain (but there is evidence of L4-5 stenosis on 10/25/21 CT). Reports she gets at least 10-20 shooting pains a day. Laying in bed is the only thing that helps. 22  Reports her surgical hip is doing pretty well 3-4/10 but her left thigh has been sore since surgery with spasms getting up to 5/10. Reports is using cane now and can walk a little without it. Pt reports she would like to start walking on treadmill again (inst her to hold off for now)  22 she had her THR and spent 1 night in the hospital.( she did  1course of steroids to help with the healing process,)she has had 2 wk of home therapy.  She was using the cane a week ago. Her L thigh has been numb since surgery, 1 wk ago she started having stabbing sharp  L thigh pain/burning. Then the home PT showed her IT band stretch/ piriformis stretch both in supine and it is feeling better now( 50%), last night she was able to sleep, she has been using a heating pad on the L thigh and ice on the R hip. She is on another course of steroids due to her thigh flaring up on fri/sat again. She has been using her walker again after the L thigh flared up. She has started celebrex again yesterday and today. She is not taking pain meds. Yesterday she started using the cane intermit at home again. .  Dr. Lino Montes told her during surgery she was on her L side with her knee up close to her chest- sometimes this bothers the nerve and/or thigh. Returns to Dr. Lino Montes 2/24/22. She is going on vacation 2/25/22  In notes from [id number] found: posterior hip precautions: no add past neutral, no flx past 90, no IR, avoid hip abd ex,         Pain    Patient describes pain to be constant R hip pain. Constant L thigh pain- ant/lat  Patient reports  Hip:    2/10 pain at rest,   4/10 pain with activity                             L  Thigh:  4-6/10  Worsened by -  incr activity  Improved by - ice/resting  Pt. also reports that the knee/hip/ankle gives out, locks, pops, grinds. no  Current Functional Limitations: not driving, chores, activities  PLOF: (I)  Pt. Sleep is disturbed? Yes due to L thigh     Patient goal for therapy:  Get stronger, progress to cane, than walk without her cane, less pain.          Exercises:  surg 1/3/22   4 wks 1/31/22  In notes from [facility] found: posterior hip precautions: no add past neutral, no flx past 90, no IR, avoid hip abd ex,   Exercise/Equipment Resistance/Repetitions Other comments   1/18/22 explained course and role of PT     Cont her current HEP     Heel slide, supine abd, GS, QS, standing heelraise, march, LAQ, hamstring curl     Single stand L 30 sec   R 20 sec x   Bridges- small ROM 2x10 x   Standing hip abd B Hold 3 sec 1x10 x   LAQ 2# Hold 10 sec  2x 10 x   Sit back squats 2x10 x   Step up fwd  4\" 2x10 2 hand    Step up bwd 4\" 2x10 2 hand              nustep s 7 L5 x 8'                                    Therapeutic Exercise:   29 min. PT came over and assessed pt L leg symptoms -recommended trial STM to lateral thigh      Group Therapy:      Home Exercise Program:      Therapeutic Activity:      Neuromuscular Re-education:      Gait:    No Assit device    Manual Therapy:  10  STM to lateral L thigh - very tender at first becoming less tender during treatment- inst in home massage aslo  Incision healing nicely - performed scar massage on R with inst for pt to do at home    Canalith Repositioning Procedure:       Modalities: declined'  CP after ex. Charges:  Timed Code Treatment Minutes: 39   Total Treatment Minutes:  39   BWC time for each procedure?:  TE TIME:  NMR TIME:  MANUAL TIME:  UNTIMED MINUTES:          [] EVAL (LOW) 15223 (typically 20 minutes face-to-face)  [] EVAL (MOD) 20327 (typically 30 minutes face-to-face)  [] EVAL (HIGH) 28772 (typically 45 minutes face-to-face)  [] RE-EVAL     [x] LO(91827) x 2    [] IONTO  [] NMR (50271) x     [] VASO  [x] Manual (14284) x  1   [] Other:  [] TA x      [] Mech Traction (32263)  [] ES(attended) (69596)     [] ES (un) (85166):     Medicare Cap Total YTD:      Treatment/Activity Tolerance:    [x] Patient tolerated treatment well [] Patient limited by fatigue   [] Patient limited by pain [] Patient limited by other medical complications   [] Other:     Prognosis: [x] Good [] Fair  [] Poor    Patient Requires Follow-up:  [x] Yes  [] No    Plan: [x] Continue per plan of care [] Alter current plan (see comments)   [] Plan of care initiated [] Hold pending MD visit [] Discharge    Plan for Next Session:  above    See Progress Note: [] Yes  [x] No       Next due:         Electronically signed by: Alayna Day, [id number]       Note: If patient does not return for scheduled/ recommended follow up visits, this note will serve as a discharge from care along with most recent update on progress.            Outpatient Physical Therapy  Progress Note      Progress Note covers period from: 1/18/22   To       Subjective:           Objective:   Observation:   Test measurements:       Functional Outcome Measure:            Assessment:   Summary:    Patient's response to treatment:      Goals:  · Progress toward previous goals:      Plan:  ·     Electronically Signed by:

## 2022-02-04 ENCOUNTER — APPOINTMENT (OUTPATIENT)
Dept: PHYSICAL THERAPY | Age: 67
End: 2022-02-04
Payer: MEDICARE

## 2022-02-08 ENCOUNTER — HOSPITAL ENCOUNTER (OUTPATIENT)
Dept: PHYSICAL THERAPY | Age: 67
Setting detail: THERAPIES SERIES
Discharge: HOME | End: 2022-02-08
Payer: MEDICARE

## 2022-02-08 PROCEDURE — 97110 THERAPEUTIC EXERCISES: CPT

## 2022-02-08 PROCEDURE — 97140 MANUAL THERAPY 1/> REGIONS: CPT

## 2022-02-08 NOTE — FLOWSHEET NOTE
walking on treadmill again (inst her to hold off for now)  1/18/22 she had her THR and spent 1 night in the hospital.( she did  1course of steroids to help with the healing process,)she has had 2 wk of home therapy. She was using the cane a week ago. Her L thigh has been numb since surgery, 1 wk ago she started having stabbing sharp  L thigh pain/burning. Then the home PT showed her IT band stretch/ piriformis stretch both in supine and it is feeling better now( 50%), last night she was able to sleep, she has been using a heating pad on the L thigh and ice on the R hip. She is on another course of steroids due to her thigh flaring up on fri/sat again. She has been using her walker again after the L thigh flared up. She has started celebrex again yesterday and today. She is not taking pain meds. Yesterday she started using the cane intermit at home again. .  Dr. Karyna Cancino told her during surgery she was on her L side with her knee up close to her chest- sometimes this bothers the nerve and/or thigh. Returns to Dr. Karyna Cancino 2/24/22. She is going on vacation 2/25/22  In notes from [id number] found: posterior hip precautions: no add past neutral, no flx past 90, no IR, avoid hip abd ex,         Pain    Patient describes pain to be constant R hip pain. Constant L thigh pain- ant/lat  Patient reports  Hip:    2/10 pain at rest,   4/10 pain with activity                             L  Thigh:  4-6/10  Worsened by -  incr activity  Improved by - ice/resting  Pt. also reports that the knee/hip/ankle gives out, locks, pops, grinds. no  Current Functional Limitations: not driving, chores, activities  PLOF: (I)  Pt. Sleep is disturbed? Yes due to L thigh     Patient goal for therapy:  Get stronger, progress to cane, than walk without her cane, less pain.          Exercises:  surg 1/3/22   4 wks 1/31/22  In notes from [facility] found: posterior hip precautions: no add past neutral, no flx past 90, no IR, avoid hip abd ex,   Exercise/Equipment Resistance/Repetitions Other comments   1/18/22 explained course and role of PT     Cont her current HEP     Heel slide, supine abd, GS, QS, standing heelraise, march, LAQ, hamstring curl     Single stand L 30 sec   R 20 sec x   Bridges- small ROM 2x10 x   Standing hip abd B Hold 3 sec 1x10 x   LAQ w/vmo 3# Hold 10 sec  2x 10 x   Sit back squats 2x10 x   Step up fwd  4\" 2x10 2 hand    Step up bwd 4\" 2x10 2 hand    SLR Corrected form x 10 x        nustep s 7 L5 x 8'                                    Therapeutic Exercise:   20 min. PROM hip flx to 90 easily    Group Therapy:      Home Exercise Program:      Therapeutic Activity:      Neuromuscular Re-education:      Gait:    No Assit device    Manual Therapy:  10  STM to lateral L thigh - very tender at first becoming less tender during treatment- inst in home massage aslo  Incision healing nicely - performed scar massage on R with inst for pt to do at home    Canalith Repositioning Procedure:       Modalities: declined'  CP after ex. Charges:  Timed Code Treatment Minutes: 30   Total Treatment Minutes:  30   BWC time for each procedure?:  TE TIME:  NMR TIME:  MANUAL TIME:  UNTIMED MINUTES:          [] EVAL (LOW) 74751 (typically 20 minutes face-to-face)  [] EVAL (MOD) 39455 (typically 30 minutes face-to-face)  [] EVAL (HIGH) 72031 (typically 45 minutes face-to-face)  [] RE-EVAL     [x] XL(91822) x 1    [] IONTO  [] NMR (28617) x     [] VASO  [x] Manual (95187) x  1   [] Other:  [] TA x      [] Mech Traction (76779)  [] ES(attended) (42178)     [] ES (un) (96877):     Medicare Cap Total YTD:      Treatment/Activity Tolerance:    [x] Patient tolerated treatment well [] Patient limited by fatigue   [] Patient limited by pain [] Patient limited by other medical complications   [] Other:     Prognosis: [x] Good [] Fair  [] Poor    Patient Requires Follow-up:  [x] Yes  [] No    Plan: [x] Continue per plan of care [] Alter current plan (see comments)   [] Plan of care initiated [] Hold pending MD visit [] Discharge    Plan for Next Session:  above    See Progress Note: [] Yes  [x] No       Next due:         Electronically signed by:  Reynold Scott, [id number]       Note: If patient does not return for scheduled/ recommended follow up visits, this note will serve as a discharge from care along with most recent update on progress.            Outpatient Physical Therapy  Progress Note      Progress Note covers period from: 1/18/22   To       Subjective:           Objective:   Observation:   Test measurements:       Functional Outcome Measure:            Assessment:   Summary:    Patient's response to treatment:      Goals:  · Progress toward previous goals:      Plan:  ·     Electronically Signed by:

## 2022-02-11 ENCOUNTER — HOSPITAL ENCOUNTER (OUTPATIENT)
Dept: PHYSICAL THERAPY | Age: 67
Setting detail: THERAPIES SERIES
Discharge: HOME | End: 2022-02-11
Payer: MEDICARE

## 2022-02-11 PROCEDURE — 97140 MANUAL THERAPY 1/> REGIONS: CPT

## 2022-02-11 PROCEDURE — 97112 NEUROMUSCULAR REEDUCATION: CPT

## 2022-02-11 PROCEDURE — 97110 THERAPEUTIC EXERCISES: CPT

## 2022-02-11 NOTE — FLOWSHEET NOTE
[address] [facility], [address]  Phone [phone]                                                [x] Daily Treatment Note   [] Progress Note   [] Discharge Note      Date:  2022    Patient Name:  Ziggy Doe        :  1955  Medical Diagnosis:OA R hip, s/p THR on 1/3/22                                                    ICD 10:  M16.11     Treatment Diagnosis:  Gait and balance disturbance, LE weakness, unable to drive,      Onset Date:    1/3/22  Monday                Referral Date: 1/3/22      Referring Physician:  Dr. Sharad Pérez of care signed (Y/N): Faxed 22    Progress report will be due (10 Rx or 30 days whichever is less):  22   MD on     Recertification will be due (POC Duration  / 90 days whichever is less):  22    Visit# / total visits:   Visit # Insurance Allowable Auth Required   In Person  5 Medicare/AARP []  Yes     [x]  No    Tele Health 0 Medical nec []  Yes     []  No    Total  5       Latex Allergy:  [x]NO      []YES    Pain level: R 2-3/10 L  3-4/10  General aching in the thigh. shooting pain  7/10 about 3-4 x a day for a few seconds. into thigh with some numbness,   3-4/10 walking. 0/10 at times. Subjective:   22  States her L thigh has improved 40%. The surgical R hip has improved 70-75%. She is leaving town 22 for a month. 22 reports pain is about the same with L thigh,  Reports right ankle swelled up on Wednesday morning and has been swollen but getting better. Reports she is going to see MD today for it. Right hip is doing well with min pain  22 reports her surgical leg is doing well but the L is really bothering her. Reports she does not really have hip or low back pain (but there is evidence of L4-5 stenosis on 10/25/21 CT). Reports she gets at least 10-20 shooting pains a day. Laying in bed is the only thing that helps.     22 Reports her surgical hip is doing pretty well 3-4/10 but her left thigh has been sore since surgery with spasms getting up to 5/10. Reports is using cane now and can walk a little without it. Pt reports she would like to start walking on treadmill again (inst her to hold off for now)  1/18/22 she had her THR and spent 1 night in the hospital.( she did  1course of steroids to help with the healing process,)she has had 2 wk of home therapy. She was using the cane a week ago. Her L thigh has been numb since surgery, 1 wk ago she started having stabbing sharp  L thigh pain/burning. Then the home PT showed her IT band stretch/ piriformis stretch both in supine and it is feeling better now( 50%), last night she was able to sleep, she has been using a heating pad on the L thigh and ice on the R hip. She is on another course of steroids due to her thigh flaring up on fri/sat again. She has been using her walker again after the L thigh flared up. She has started celebrex again yesterday and today. She is not taking pain meds. Yesterday she started using the cane intermit at home again. .  Dr. Lynnie Lesch told her during surgery she was on her L side with her knee up close to her chest- sometimes this bothers the nerve and/or thigh. Returns to Dr. Lynnie Lesch 2/24/22. She is going on vacation 2/25/22  In [facility] from [id number] found: posterior hip precautions: no add past neutral, no flx past 90, no IR, avoid hip abd ex,         Pain    Patient describes pain to be constant R hip pain. Constant L thigh pain- ant/lat  Patient reports  Hip:    2/10 pain at rest,   4/10 pain with activity                             L  Thigh:  4-6/10  Worsened by -  incr activity  Improved by - ice/resting  Pt. also reports that the knee/hip/ankle gives out, locks, pops, grinds. no  Current Functional Limitations: not driving, chores, activities  PLOF: (I)  Pt. Sleep is disturbed?  Yes due to L thigh     Patient goal for therapy:  Get stronger, progress to cane, than walk without her cane, less pain. Exercises:  surg 1/3/22    6 wks  2/ 14/22  In notes from [facility] found: posterior hip precautions: no add past neutral, no flx past 90, no IR, avoid hip abd ex,   Exercise/Equipment Resistance/Repetitions Other comments   1/18/22 explained course and role of PT     Cont her current HEP     Heel slide, supine abd, GS, QS, standing heelraise, march, LAQ, hamstring curl     Single stand L 30 sec   R 30 sec x   Bridges- small ROM 2x10 x   Standing hip abd B Hold 3 sec 1x10 x   LAQ w/vmo 3# Hold 10 sec  2x 10 x   Sit back squats 2x10 x   Step up fwd  6\" 2x10 2 hand    Step up bwd 6\" 2x10 2 hand    SLR Corrected form x 10 x   2/11/22 leaning hip ext to  neutrl 2x10    nustep s 7 L6 x 8'                                         Therapeutic Exercise:    30 min. PROM hip flx to 90 easily    Group Therapy:      Home Exercise Program:      Therapeutic Activity:      Neuromuscular Re-education:   15 min   Single stand 30 sec  Bilat. Agility: center line, backwds, wide step, high march, narrow/wide  Will try single stand with ball toss    Gait:    No Assit device    Manual Therapy:   8 min  STM to lateral L thigh -  Moderately tender. Incision healing nicely - performed scar massage on R with inst for pt to do at home    Canalith Repositioning Procedure:       Modalities: declined'  CP after ex.     Charges:  Timed Code Treatment Minutes: 53    Total Treatment Minutes:   60   BWC time for each procedure?:  TE TIME:  NMR TIME:  MANUAL TIME:  UNTIMED MINUTES:          [] EVAL (LOW) 11306 (typically 20 minutes face-to-face)  [] EVAL (MOD) 82144 (typically 30 minutes face-to-face)  [] EVAL (HIGH) 57369 (typically 45 minutes face-to-face)  [] RE-EVAL     [x] YP(88554) x 2    [] IONTO  [x] NMR (92072) x     [] VASO  [x] Manual (50545) x  1   [] Other:  [] TA x      [] Mech Traction (39285)  [] ES(attended) (89938)     [] ES (un) (09160): Medicare Cap Total YTD:      Treatment/Activity Tolerance:    [x] Patient tolerated treatment well [] Patient limited by fatigue   [] Patient limited by pain [] Patient limited by other medical complications   [] Other:     Prognosis: [x] Good [] Fair  [] Poor    Patient Requires Follow-up:  [x] Yes  [] No    Plan: [x] Continue per plan of care [] Alter current plan (see comments)   [] Plan of care initiated [] Hold pending MD visit [] Discharge    Plan for Next Session:  above    See Progress Note: [] Yes  [x] No       Next due:   2/17/22      Electronically signed by:  Rosalba Young PT, Lashawn.Sportsman  MOMT       Note: If patient does not return for scheduled/ recommended follow up visits, this note will serve as a discharge from care along with most recent update on progress.            Outpatient Physical Therapy  Progress Note      Progress Note covers period from: 1/18/22   To       Subjective:           Objective:   Observation:   Test measurements:       Functional Outcome Measure:            Assessment:   Summary:    Patient's response to treatment:      Goals:  · Progress toward previous goals:      Plan:  ·     Electronically Signed by:

## 2022-02-15 ENCOUNTER — HOSPITAL ENCOUNTER (OUTPATIENT)
Dept: PHYSICAL THERAPY | Age: 67
Setting detail: THERAPIES SERIES
Discharge: HOME | End: 2022-02-15
Payer: MEDICARE

## 2022-02-15 PROCEDURE — 97140 MANUAL THERAPY 1/> REGIONS: CPT

## 2022-02-15 PROCEDURE — 97112 NEUROMUSCULAR REEDUCATION: CPT

## 2022-02-15 PROCEDURE — 97110 THERAPEUTIC EXERCISES: CPT

## 2022-02-15 NOTE — FLOWSHEET NOTE
[address] [facility], [address]  Phone [phone]                                                [x] Daily Treatment Note   [] Progress Note   [] Discharge Note      Date:  2/15/2022    Patient Name:  Larry Lane        :  1955  Medical Diagnosis:OA R hip, s/p THR on 1/3/22                                                    ICD 10:  M16.11     Treatment Diagnosis:  Gait and balance disturbance, LE weakness, unable to drive,      Onset Date:    1/3/22  Monday                Referral Date: 1/3/22      Referring Physician:  Dr. [facility]cal[facility] of care signed (Y/N): Faxed 22    Progress report will be due (10 Rx or 30 days whichever is less): NV     Recertification will be due (POC Duration  / 90 days whichever is less):  22    Visit# / total visits:   Visit # Insurance Allowable Auth Required   In Person 6 Medicare/AARP []  Yes     [x]  No    Tele Health 0 Medical nec []  Yes     []  No    Total 6       Latex Allergy:  [x]NO      []YES    Pain level: R 2-3/10 L  5/10  General aching in the thigh. shooting pain  7/10 about 3-4 x a day for a few seconds. into thigh with some numbness,   3-4/10 walking. 0/10 at times. Subjective:   2/15/22 reports right is still doing well but left is more painful today. Reports she saw the doctor on the  and they told her she did not need to come back to them unless she has a flare up.  22  States her L thigh has improved 40%. The surgical R hip has improved 70-75%. She is leaving [facility] 22 for a month. 22 reports pain is about the same with L thigh,  Reports right ankle swelled up on Wednesday morning and has been swollen but getting better. Reports she is going to see MD today for it. Right hip is doing well with min pain  22 reports her surgical leg is doing well but the L is really bothering her.   Reports she does not really have hip or low back pain (but there is evidence of L4-5 stenosis on 10/25/21 CT). Reports she gets at least 10-20 shooting pains a day. Laying in bed is the only thing that helps. 1/25/22  Reports her surgical hip is doing pretty well 3-4/10 but her left thigh has been sore since surgery with spasms getting up to 5/10. Reports is using cane now and can walk a little without it. Pt reports she would like to start walking on treadmill again (inst her to hold off for now)  1/18/22 she had her THR and spent 1 night in the hospital.( she did  1course of steroids to help with the healing process,)she has had 2 wk of home therapy. She was using the cane a week ago. Her L thigh has been numb since surgery, 1 wk ago she started having stabbing sharp  L thigh pain/burning. Then the home PT showed her IT band stretch/ piriformis stretch both in supine and it is feeling better now( 50%), last night she was able to sleep, she has been using a heating pad on the L thigh and ice on the R hip. She is on another course of steroids due to her thigh flaring up on fri/sat again. She has been using her walker again after the L thigh flared up. She has started celebrex again yesterday and today. She is not taking pain meds. Yesterday she started using the cane intermit at home again. .  Dr. Jia Weinstein told her during surgery she was on her L side with her knee up close to her chest- sometimes this bothers the nerve and/or thigh. Returns to Dr. Jia Weinstein 2/24/22. She is going on vacation 2/25/22  In notes from [facility] found: posterior hip precautions: no add past neutral, no flx past 90, no IR, avoid hip abd ex,         Pain    Patient describes pain to be constant R hip pain. Constant L thigh pain- ant/lat  Patient reports  Hip:    2/10 pain at rest,   4/10 pain with activity                             L  Thigh:  4-6/10  Worsened by -  incr activity  Improved by - ice/resting  Pt.  also reports that the knee/hip/ankle gives out, locks, pops, grinds. no  Current Functional Limitations: not driving, chores, activities  PLOF: (I)  Pt. Sleep is disturbed? Yes due to L thigh     Patient goal for therapy:  Get stronger, progress to cane, than walk without her cane, less pain. Exercises:  surg 1/3/22    6 wks  2/ 14/22  In notes from [facility] found: posterior hip precautions: no add past neutral, no flx past 90, no IR, avoid hip abd ex,   Exercise/Equipment Resistance/Repetitions Other comments   1/18/22 explained course and role of PT     Cont her current HEP     Heel slide, supine abd, GS, QS, standing heelraise, march, LAQ, hamstring curl     Single stand L 30 sec   R 30 sec x   Bridges- small ROM 2x10 x   Standing hip abd B Hold 3 sec 1x10 x   LAQ w/vmo 3# Hold 10 sec  2x 10 x   Sit back squats 2x10 x   Step up fwd  6\" 2x10 2 hand    Step up bwd 6\" 2x10 2 hand    SLR Corrected form x 10 x   2/11/22 leaning hip ext to  neutrl 3x10 x   nustep s 7 L6 x 8'    sidelying ITB stretch 3x30\" to L x                                   Therapeutic Exercise:    22 min. Group Therapy:      Home Exercise Program:      Therapeutic Activity:      Neuromuscular Re-education:   10 min    Single stand 30 sec  Bilat. Agility: center line, backwds, wide step, high march, narrow/wide  single stand with ball toss challenge    Gait:    No Assit device    Manual Therapy:   8 min  STM to lateral L thigh -  Moderately tender. Inc    Canalith Repositioning Procedure:       Modalities: declined'  CP after ex.     Charges:  Timed Code Treatment Minutes: 40   Total Treatment Minutes:  40   BWC time for each procedure?:  TE TIME:  NMR TIME:  MANUAL TIME:  UNTIMED MINUTES:          [] EVAL (LOW) 82043 (typically 20 minutes face-to-face)  [] EVAL (MOD) 11254 (typically 30 minutes face-to-face)  [] EVAL (HIGH) 04608 (typically 45 minutes face-to-face)  [] RE-EVAL     [x] VO(24424) x 1   [] IONTO  [x] NMR (26045) x 1     [] VASO  [x] Manual (09708) x  1   [] Other:  [] TA x      [] [facility] Traction (16516)  [] ES(attended) (64978)     [] ES (un) (54716): Medicare Cap Total YTD:      Treatment/Activity Tolerance:    [x] Patient tolerated treatment well [] Patient limited by fatigue   [] Patient limited by pain [] Patient limited by other medical complications   [] Other:     Prognosis: [x] Good [] Fair  [] Poor    Patient Requires Follow-up:  [x] Yes  [] No    Plan: [x] Continue per plan of care [] Alter current plan (see comments)   [] Plan of care initiated [] Hold pending MD visit [] Discharge    Plan for Next Session:  above    See Progress Note: [] Yes  [x] No       Next due:   2/17/22      Electronically signed by:  Mac Watson [id number]       Note: If patient does not return for scheduled/ recommended follow up visits, this note will serve as a discharge from care along with most recent update on progress.            Outpatient Physical Therapy  Progress Note      Progress Note covers period from: 1/18/22   To       Subjective:           Objective:   Observation:   Test measurements:       Functional Outcome Measure:            Assessment:   Summary:    Patient's response to treatment:      Goals:  · Progress toward previous goals:      Plan:  ·     Electronically Signed by:

## 2022-02-18 ENCOUNTER — HOSPITAL ENCOUNTER (OUTPATIENT)
Dept: PHYSICAL THERAPY | Age: 67
Setting detail: THERAPIES SERIES
Discharge: HOME | End: 2022-02-18
Payer: MEDICARE

## 2022-02-18 PROCEDURE — 97110 THERAPEUTIC EXERCISES: CPT

## 2022-02-18 PROCEDURE — 97140 MANUAL THERAPY 1/> REGIONS: CPT

## 2022-02-18 NOTE — FLOWSHEET NOTE
[address] [facility], [address]  Phone [phone]                                                [x] Daily Treatment Note   [x] Progress Note  22  [] Discharge Note      Date:  2022    Patient Name:  Patito Valente        :  1955  Medical Diagnosis:OA R hip, s/p THR on 1/3/22                                                    ICD 10:  M16.11     Treatment Diagnosis:  Gait and balance disturbance, LE weakness, unable to drive,      Onset Date:    1/3/22  Monday                Referral Date: 1/3/22      Referring Physician:  Dr. Mohan Redo of care signed (Y/N): Faxed 22    Progress report will be due (10 Rx or 30 days whichever is less): NV     Recertification will be due (POC Duration  / 90 days whichever is less):  22    Visit# / total visits:   Visit # Insurance Allowable Auth Required   In Person  7 Medicare/AARP []  Yes     [x]  No    [facility] 0 Medical nec []  Yes     []  No    Total  7       Latex Allergy:  [x]NO      []YES    Pain level: R  0/10 at rest   0-2/10 walking /10      L  3-4/10  General aching in the thigh thru mid day.    5-6/10  Late afternoon and evening. shooting pain  7/10 about 1-2 x a day for a few seconds. into thigh with some numbness,   3-4/10 walking. 0/10 at times. Subjective:   22  Will need d/c note on NV  . At least 50% improved from the beginning. The most problem is the L thigh. Doing well in general. mon will be her last visit. She is leaving on vacation on next  Fri.  2/15/22 reports right is still doing well but left is more painful today. Reports she saw the doctor on the  and they told her she did not need to come back to them unless she has a flare up.  22  States her L thigh has improved 40%. The surgical R hip has improved 70-75%. She is leaving town 22 for a month.   22 reports pain is about the same with L thigh, describes pain to be constant R hip pain. Constant L thigh pain- ant/lat  Patient reports  Hip:    2/10 pain at rest,   4/10 pain with activity                             L  Thigh:  4-6/10  Worsened by -  incr activity  Improved by - ice/resting  Pt. also reports that the knee/hip/ankle gives out, locks, pops, grinds. no  Current Functional Limitations: not driving, chores, activities  PLOF: (I)  Pt. Sleep is disturbed? Yes due to L thigh     Patient goal for therapy:  Get stronger, progress to cane, than walk without her cane, less pain. Exercises:  surg 1/3/22    6 wks  2/ 14/22       8 wks 2/28/22  In notes from [facility] found: posterior hip precautions: no add past neutral, no flx past 90, no IR, avoid hip abd ex,   Exercise/Equipment Resistance/Repetitions Other comments   1/18/22 explained course and role of PT     Cont her current HEP     Heel slide, supine abd, GS, QS, standing heelraise, march, LAQ, hamstring curl     Single stand L 30 sec   R 30 sec x   Bridges- small ROM 2x10 x   Standing hip abd B Hold 3 sec 1x10 x   LAQ w/vmo 3# Hold 10 sec  2x 10 x   Sit back squats 2x10 x   Step up fwd      bilat 6\" 3x10 2 hand    Step up bwd    bilat 6\" 3x10 2 hand    SLR Corrected form x 10 x   2/11/22 leaning hip ext to  neutrl 3x10 x   nustep s 7 L6 x 8'    sidelying ITB stretch 3x30\" to L x   2/18/22 leg press 3    3 x 10                Leg press single- R only 1    1x10    Some R knee irritation                          Therapeutic Exercise:     30 min. Group Therapy:      Home Exercise Program:      Therapeutic Activity:      Neuromuscular Re-education:    5 min  . Added arm or leg challenges to single stand. Single stand 30 sec  Bilat. Agility: center line, backwds, wide step, high march, narrow/wide  single stand with ball toss challenge    Gait:    No Assit device    Manual Therapy:    15   Min   Hip flx to 100 in supine.   STM to lateral L thigh -  Moderately tender. Inc    Canalith Repositioning Procedure:       Modalities: declined'  CP after ex. Charges:  Timed Code Treatment Minutes:  50   Total Treatment Minutes:   50   BWC time for each procedure?:  TE TIME:  NMR TIME:  MANUAL TIME:  UNTIMED MINUTES:          [] EVAL (LOW) 45151 (typically 20 minutes face-to-face)  [] EVAL (MOD) 83874 (typically 30 minutes face-to-face)  [] EVAL (HIGH) 68758 (typically 45 minutes face-to-face)  [] RE-EVAL     [x] QZ(38554) x 2   [] IONTO  [] NMR (22374) x 1     [] VASO  [x] Manual (13388) x  1   [] Other:  [] TA x      [] Mech Traction (22645)  [] ES(attended) (37749)     [] ES (un) (75174): Medicare Cap Total YTD:      Treatment/Activity Tolerance:    [x] Patient tolerated treatment well [] Patient limited by fatigue   [] Patient limited by pain [] Patient limited by other medical complications   [] Other:     Prognosis: [x] Good [] Fair  [] Poor    Patient Requires Follow-up:  [x] Yes  [] No    Plan: [x] Continue per plan of care [] Alter current plan (see comments)   [] Plan of care initiated [] Hold pending MD visit [] Discharge    Plan for Next Session:  above    See Progress Note: [] Yes  [x] No       Next due:   2/17/22      Electronically signed by:  Akbar Pruitt PT, Dymphna.Gentles MOMT       Note: If patient does not return for scheduled/ recommended follow up visits, this note will serve as a discharge from care along with most recent update on progress.            Outpatient Physical Therapy  Progress Note      Progress Note covers period from: 1/18/22   To       Subjective:           Objective:   Observation:   Test measurements:       Functional Outcome Measure:            Assessment:   Summary:    Patient's response to treatment:      Goals:  · Progress toward previous goals:      Plan:  ·     Electronically Signed by:

## 2022-02-21 ENCOUNTER — HOSPITAL ENCOUNTER (OUTPATIENT)
Dept: PHYSICAL THERAPY | Age: 67
Setting detail: THERAPIES SERIES
Discharge: HOME | End: 2022-02-21
Payer: MEDICARE

## 2022-02-21 PROCEDURE — 97110 THERAPEUTIC EXERCISES: CPT

## 2022-02-21 PROCEDURE — 97140 MANUAL THERAPY 1/> REGIONS: CPT

## 2022-02-21 NOTE — DISCHARGE SUMMARY
[address] [facility], [address]  Phone [phone]                                                [x] Daily Treatment Note   [x] Progress Note  22  [x] Discharge Note  22      Date:  2022    Patient Name:  Aly Berg        :  1955  Medical Diagnosis:OA R hip, s/p THR on 1/3/22                                                    ICD 10:  M16.11     Treatment Diagnosis:  Gait and balance disturbance, LE weakness, unable to drive,      Onset Date:    1/3/22  Monday                Referral Date: 1/3/22      Referring Physician:  Dr. Colby Carnes of care signed (Y/N): Faxed 22    Progress report will be due (10 Rx or 30 days whichever is less): NV     Recertification will be due (POC Duration  / 90 days whichever is less):  22    Visit# / total visits:   Visit # Insurance Allowable Auth Required   In Person  8 Medicare/AARP []  Yes     [x]  No    [facility] 0 Medical nec []  Yes     []  No    Total  7       Latex Allergy:  [x]NO      []YES    Pain level: R  0/10 at rest   0-2/10 walking        L  3-4/10  General aching in the thigh thru mid day.    5-6/10  Late afternoon and evening. shooting pain  7/10 about 1-2 x a day for a few seconds. into thigh with some numbness,   3-4/10 walking. 0/10 at times. Subjective:   22  Will need d/c note on NV  . At least 50% improved from the beginning. The most problem is the L thigh. Doing well in general. mon will be her last visit. She is leaving on vacation on next  Fri.  2/15/22 reports right is still doing well but left is more painful today. Reports she saw the doctor on the  and they told her she did not need to come back to them unless she has a flare up.  22  States her L thigh has improved 40%. The surgical R hip has improved 70-75%. She is leaving town 22 for a month.   22 reports pain is about the same with L thigh,  Reports right ankle swelled up on Wednesday morning and has been swollen but getting better. Reports she is going to see MD today for it. Right hip is doing well with min pain  2/1/22 reports her surgical leg is doing well but the L is really bothering her. Reports she does not really have hip or low back pain (but there is evidence of L4-5 stenosis on 10/25/21 CT). Reports she gets at least 10-20 shooting pains a day. Laying in bed is the only thing that helps. 1/25/22  Reports her surgical hip is doing pretty well 3-4/10 but her left thigh has been sore since surgery with spasms getting up to 5/10. Reports is using cane now and can walk a little without it. Pt reports she would like to start walking on treadmill again (inst her to hold off for now)  1/18/22 she had her THR and spent 1 night in the hospital.( she did  1course of steroids to help with the healing process,)she has had 2 wk of home therapy. She was using the cane a week ago. Her L thigh has been numb since surgery, 1 wk ago she started having stabbing sharp  L thigh pain/burning. Then the home PT showed her IT band stretch/ piriformis stretch both in supine and it is feeling better now( 50%), last night she was able to sleep, she has been using a heating pad on the L thigh and ice on the R hip. She is on another course of steroids due to her thigh flaring up on fri/sat again. She has been using her walker again after the L thigh flared up. She has started celebrex again yesterday and today. She is not taking pain meds. Yesterday she started using the cane intermit at home again. .  Dr. Piotr Gomez told her during surgery she was on her L side with her knee up close to her chest- sometimes this bothers the nerve and/or thigh. Returns to Dr. Piotr Gomez 2/24/22.   She is going on vacation 2/25/22  In notes from [facility] found: posterior hip precautions: no add past neutral, no flx past 90, no IR, avoid hip abd ex,         Pain Patient describes pain to be constant R hip pain. Constant L thigh pain- ant/lat  Patient reports  Hip:    2/10 pain at rest,   4/10 pain with activity                             L  Thigh:  4-6/10  Worsened by -  incr activity  Improved by - ice/resting  Pt. also reports that the knee/hip/ankle gives out, locks, pops, grinds. no  Current Functional Limitations: not driving, chores, activities  PLOF: (I)  Pt. Sleep is disturbed? Yes due to L thigh     Patient goal for therapy:  Get stronger, progress to cane, than walk without her cane, less pain. Exercises:  surg 1/3/22    6 wks  2/ 14/22       8 wks 2/28/22  In [facility] from [facility] found: posterior hip precautions: no add past neutral, no flx past 90, no IR, avoid hip abd ex,   Exercise/Equipment Resistance/Repetitions Other comments   1/18/22 explained course and role of PT     Cont her current HEP     Heel slide, supine abd, GS, QS, standing heelraise, march, LAQ, hamstring curl     Single stand L 30 sec   R 30 sec x   Bridges- small ROM 2x10 x   Standing hip abd B Hold 3 sec 1x10 x   LAQ w/vmo 3# Hold 10 sec  2x 10 x   Sit back squats 2x10 x   Step up fwd      bilat 6\" 3x10 2 hand    Step up bwd    bilat 6\" 3x10 2 hand    SLR Corrected form x 10 x   2/11/22 leaning hip ext to  neutrl 3x10 x   nustep s 7 L6 x 9'    sidelying ITB stretch 3x30\" to L x   2/18/22 leg press 3    3 x 10                Leg press single-  bilato  1     3x5         Leg ext maching 1 plate     2x5                     Therapeutic Exercise:      25 min. Group Therapy:      Home Exercise Program:      Therapeutic Activity:      Neuromuscular Re-education:      min  . Added arm or leg challenges to single stand. Single stand 30 sec  Bilat. Agility: center line, backwds, wide step, high march, narrow/wide  single stand with ball toss challenge    Gait:    No Assit device    Manual Therapy:    15   Min   Hip flx to 100 in supine. Hip flx to 90 in stand.    prog note  STM to lateral L thigh -  Moderately tender. Inc    Canalith Repositioning Procedure:       Modalities: declined'  CP after ex. Charges:  Timed Code Treatment Minutes:  40   Total Treatment Minutes:    40   BWC time for each procedure?:  TE TIME:  NMR TIME:  MANUAL TIME:  UNTIMED MINUTES:          [] EVAL (LOW) 80582 (typically 20 minutes face-to-face)  [] EVAL (MOD) 32314 (typically 30 minutes face-to-face)  [] EVAL (HIGH) 27942 (typically 45 minutes face-to-face)  [] RE-EVAL     [x] ZM(80856) x 2   [] IONTO  [] NMR (17238) x 1     [] VASO  [x] Manual (88577) x  1   [] Other:  [] TA x      [] Mech Traction (21827)  [] ES(attended) (37235)     [] ES (un) (48096): Medicare Cap Total YTD:      Treatment/Activity Tolerance:    [x] Patient tolerated treatment well [] Patient limited by fatigue   [] Patient limited by pain [] Patient limited by other medical complications   [] Other:     Prognosis: [x] Good [] Fair  [] Poor    Patient Requires Follow-up:  [x] Yes  [] No    Plan: [x] Continue per plan of care [] Alter current plan (see comments)   [] Plan of care initiated [] Hold pending MD visit [] Discharge    Plan for Next Session:  above    See Progress Note: [] Yes  [x] No       Next due:   2/17/22      Electronically signed by:  Soheila Davila PT, Germayn.Feeling  MOMT       Note: If patient does not return for scheduled/ recommended follow up visits, this note will serve as a discharge from care along with most recent update on progress. Outpatient Physical Therapy  Progress Note      Progress Note covers period from: 1/18/22   To  2/21/22      Subjective:   R hip: Reports 0/10 at rest  0-2/10 with more activity   L thigh:  3-4/10 at rest and with walking. 5/10 later in the day.    7/10 shooting L thigh pain, a few every dayl         Objective:   Observation:  Ambulating    Test measurements:   R hip flx 4+/5  Ext 4+/5   4/5 abd    4/5 ER   4+/5 quad   4+/5 hams   5/5 DF   L hip      5/5          5/5      5/5           5/5              5/5             5/5           5/5                             AROM:  R hip to 90 in standding.                         Balance:  30 sec single stand L.                         Stairs: 1 flight reciprocally          Functional Outcome Measure:       Measure used:  LEFS                                  2/21/22  Score:  19                                                    Score: 56  % Disability:  76%                                   % disability:  30% ( running/sharp turns unable)       Assessment: doing well in all areas. L thigh pain from surgery is the most limiting. The R hip is doing very well after surgery   Summary: seen x 8 visits.  Patient's response to treatment: excellent     Goals:Short Term Goals: 2 wks  1. Independent in HEP and progression per patient tolerance, in order to prevent re-injury. []? Progressing: [x]? Met: []? Not Met: []? Adjusted       2. Patient will have a decrease in pain to facilitate improvement in movement, function, and ADLs as indicated by Functional Deficits. []? Progressing: [x]? Met: []? Not Met: []? Adjusted          Long Term Goals: 4-8 wks  1. Disability index score of 30% or less for the LEFs functional questionnaire to assist with reaching prior level of function. []? Progressing: [x]? Met: []? Not Met: []? Adjusted      2. Patient will demonstrate increased AROM to  Limited to 90 flx for 6-12 wks. to allow for proper joint functioning as indicated by patients Functional Deficits. []? Progressing:  [x]? Met: []? Not Met: []? Adjusted       3. Patient will demonstrate an increase in strength to  4+/5 for LE to allow for proper functional mobility as indicated by patients Functional Deficits. Ambulate with or without st cane. Climb 4 steps reciprocally  []? Progressing: [x]? Met: []? Not Met: []? Adjusted       4.  Patient will return to all transfers, work activities, and functional activities without increased symptoms or restriction. []? Progressing: [x]? Met: []? Not Met: []? Adjusted       5. Patient will have 0-2/10 pain with ADL's.  []? Progressing: [x]? Met: []? Not Met: []? Adjusted       6. Patient stated goal:  Get stronger, walk better, go on vacation in Feb  []? Progressing: [x]? Met: []? Not Met: []? Adjusted          · Progress toward previous goals: All goals met. Plan: doing well in all areas. · D/c further PT. She is leaving on vacation in a few days. Have given her more advanced exercises to start at 10 weeks.     Electronically Signed by: Raul Banuelos PT [id number]  [address] [place] [address]

## 2022-02-22 ENCOUNTER — APPOINTMENT (OUTPATIENT)
Dept: PHYSICAL THERAPY | Age: 67
End: 2022-02-22
Payer: MEDICARE

## 2022-02-25 ENCOUNTER — APPOINTMENT (OUTPATIENT)
Dept: PHYSICAL THERAPY | Age: 67
End: 2022-02-25
Payer: MEDICARE

## (undated) DEVICE — ENDO CARRY-ON PROCEDURE KIT INCLUDES SUCTION TUBING, LUBRICANT, GAUZE, BIOHAZARD STICKER, TRANSPORT PAD AND INTERCEPT BEDSIDE KIT.: Brand: ENDO CARRY-ON PROCEDURE KIT

## (undated) DEVICE — CONMED SCOPE SAVER BITE BLOCK, 20X27 MM: Brand: SCOPE SAVER